# Patient Record
Sex: FEMALE | Race: ASIAN | Employment: UNEMPLOYED | URBAN - METROPOLITAN AREA
[De-identification: names, ages, dates, MRNs, and addresses within clinical notes are randomized per-mention and may not be internally consistent; named-entity substitution may affect disease eponyms.]

---

## 2017-02-17 ENCOUNTER — OFFICE VISIT (OUTPATIENT)
Dept: INTERNAL MEDICINE CLINIC | Age: 57
End: 2017-02-17

## 2017-02-17 VITALS
TEMPERATURE: 97.1 F | HEIGHT: 60 IN | BODY MASS INDEX: 28.47 KG/M2 | RESPIRATION RATE: 16 BRPM | OXYGEN SATURATION: 95 % | DIASTOLIC BLOOD PRESSURE: 66 MMHG | WEIGHT: 145 LBS | HEART RATE: 79 BPM | SYSTOLIC BLOOD PRESSURE: 138 MMHG

## 2017-02-17 DIAGNOSIS — M19.90 ARTHRITIS: ICD-10-CM

## 2017-02-17 DIAGNOSIS — M17.12 ARTHRITIS OF LEFT KNEE: Primary | ICD-10-CM

## 2017-02-17 RX ORDER — MELOXICAM 15 MG/1
15 TABLET ORAL
Qty: 30 TAB | Refills: 1 | Status: SHIPPED | OUTPATIENT
Start: 2017-02-17 | End: 2017-02-22 | Stop reason: SDUPTHER

## 2017-02-17 RX ORDER — ACETAMINOPHEN AND CODEINE PHOSPHATE 300; 30 MG/1; MG/1
1 TABLET ORAL
Qty: 20 TAB | Refills: 0 | Status: SHIPPED | OUTPATIENT
Start: 2017-02-17 | End: 2017-02-22 | Stop reason: SDUPTHER

## 2017-02-17 NOTE — PATIENT INSTRUCTIONS
Take the Meloxicam once daily for the next 1-2 weeks. Avoid taking with other with other NSAIDs (Asprin, ibuprofen, Motrin, Aleve, etc.). Take the Tylenol with codeine as needed for extreme pain. Arthritis: Care Instructions  Your Care Instructions  Arthritis, also called osteoarthritis, is a breakdown of the cartilage that cushions your joints. When the cartilage wears down, your bones rub against each other. This causes pain and stiffness. Many people have some arthritis as they age. Arthritis most often affects the joints of the spine, hands, hips, knees, or feet. You can take simple measures to protect your joints, ease your pain, and help you stay active. Follow-up care is a key part of your treatment and safety. Be sure to make and go to all appointments, and call your doctor if you are having problems. It's also a good idea to know your test results and keep a list of the medicines you take. How can you care for yourself at home? · Stay at a healthy weight. Being overweight puts extra strain on your joints. · Talk to your doctor or physical therapist about exercises that will help ease joint pain. ¨ Stretch. You may enjoy gentle forms of yoga to help keep your joints and muscles flexible. ¨ Walk instead of jog. Other types of exercise that are less stressful on the joints include riding a bicycle, swimming, or water exercise. ¨ Lift weights. Strong muscles help reduce stress on your joints. Stronger thigh muscles, for example, take some of the stress off of the knees and hips. Learn the right way to lift weights so you do not make joint pain worse. · Take your medicines exactly as prescribed. Call your doctor if you think you are having a problem with your medicine. · Take pain medicines exactly as directed. ¨ If the doctor gave you a prescription medicine for pain, take it as prescribed.   ¨ If you are not taking a prescription pain medicine, ask your doctor if you can take an over-the-counter medicine. · Use a cane, crutch, walker, or another device if you need help to get around. These can help rest your joints. You also can use other things to make life easier, such as a higher toilet seat and padded handles on kitchen utensils. · Do not sit in low chairs, which can make it hard to get up. · Put heat or cold on your sore joints as needed. Use whichever helps you most. You also can take turns with hot and cold packs. ¨ Apply heat 2 or 3 times a day for 20 to 30 minutes--using a heating pad, hot shower, or hot pack--to relieve pain and stiffness. ¨ Put ice or a cold pack on your sore joint for 10 to 20 minutes at a time. Put a thin cloth between the ice and your skin. When should you call for help? Call your doctor now or seek immediate medical care if:  · You have sudden swelling, warmth, or pain in any joint. · You have joint pain and a fever or rash. · You have such bad pain that you cannot use a joint. Watch closely for changes in your health, and be sure to contact your doctor if:  · You have mild joint symptoms that continue even with more than 6 weeks of care at home. · You have stomach pain or other problems with your medicine. Where can you learn more? Go to http://caryn-gerard.info/. Enter N355 in the search box to learn more about \"Arthritis: Care Instructions. \"  Current as of: February 24, 2016  Content Version: 11.1  © 2006-2016 "[employer]. Care instructions adapted under license by [employer] (which disclaims liability or warranty for this information). If you have questions about a medical condition or this instruction, always ask your healthcare professional. [place] [address] any warranty or liability for your use of this information. Meloxicam (By mouth)   Meloxicam (ykr-FH-q-melanie)  Treats symptoms of osteoarthritis (OA) and rheumatoid arthritis (RA). This medicine is an NSAID.    Brand Name(s):Comfort Pac with Meloxicam, Mobic, Vivlodex   There may be other brand names for this medicine. When This Medicine Should Not Be Used: This medicine is not right for everyone. Do not use it if you had an allergic reaction to meloxicam or another NSAID drug, including aspirin. How to Use This Medicine:   Capsule, Liquid, Tablet  · Take your medicine as directed. Your dose may need to be changed several times to find what works best for you. · You may take this medicine with or without food. If this medicine upsets your stomach, take it with food or milk. · Capsule or tablet: Swallow the capsule or tablet whole. Do not crush, break, or chew it. · Oral liquid: Shake the bottle gently before use. Measure the oral liquid medicine with a marked measuring spoon, oral syringe, or medicine cup. · This medicine should come with a Medication Guide. Ask your pharmacist for a copy if you do not have one. · Missed dose: Take a dose as soon as you remember. If it is almost time for your next dose, wait until then and take a regular dose. Do not take extra medicine to make up for a missed dose. · Store the medicine in a closed container at room temperature, away from heat, moisture, and direct light. Drugs and Foods to Avoid:   Ask your doctor or pharmacist before using any other medicine, including over-the-counter medicines, vitamins, and herbal products. · Do not use aspirin or any other NSAID medicine unless your doctor says it is okay. · Do not take the oral liquid together with Kayexalate®. Mobic® oral liquid contains sorbitol, which may cause a serious bowel problem if taken with sodium polystyrene sulfonate (Bristol Lota). · Some medicines can affect how meloxicam works.  Tell your doctor if you are also using any of the following:  ¨ Cholestyramine, cyclosporine, digoxin, lithium, methotrexate, pemetrexed  ¨ Blood pressure medicine  ¨ Blood thinner (including warfarin)  ¨ Diuretic (water pill)  ¨ Medicine to treat depression  ¨ Steroid medicine (including hydrocortisone, methylprednisolone, prednisolone, prednisone)  Warnings While Using This Medicine:   · Tell your doctor if you are pregnant or breastfeeding. Do not use this medicine during the later part of pregnancy unless your doctor tells you to. · Tell your doctor if you have a history of ulcers or other stomach problems, kidney disease, liver disease, heart disease, high blood pressure, heart failure, blood circulation problems, or asthma. Tell your doctor if you smoke or drink alcohol regularly  · This medicine may cause the following problems:  ¨ Increased risk of blood clots, heart attack, or stroke  ¨ Increased risk of stomach or bowel bleeding  ¨ High blood pressure  ¨ Liver or kidney problems  ¨ Anemia  · Tell your doctor if you are trying to get pregnant. Some women who take this medicine have trouble getting pregnant. · Your doctor will do lab tests at regular visits to check on the effects of this medicine. Keep all appointments. · Keep all medicine out of the reach of children. Never share your medicine with anyone.   Possible Side Effects While Using This Medicine:   Call your doctor right away if you notice any of these side effects:  · Allergic reaction: Itching or hives, swelling in your face or hands, swelling or tingling in your mouth or throat, chest tightness, trouble breathing  · Blistering, peeling, or red skin rash  · Change in how much or how often you urinate, painful urination  · Chest pain, trouble breathing, coughing up blood  · Dark urine or pale stools, nausea, vomiting, loss of appetite, stomach pain, yellow skin or eyes  · Numbness or weakness on one side of your body, sudden or severe headache, problems with vision, speech, or walking  · Rapid weight gain, swelling in your hands, ankles, or feet  · Severe stomach pain, vomiting blood or material that looks like coffee grounds, bloody or black, tarry stools  · Unusual bleeding, bruising, or weakness  If you notice these less serious side effects, talk with your doctor:   · Mild nausea, diarrhea, heartburn  If you notice other side effects that you think are caused by this medicine, tell your doctor. Call your doctor for medical advice about side effects. You may report side effects to FDA at 6-065-NOU-9172  © 2016 [address] is for End User's use only and may not be sold, redistributed or otherwise used for commercial purposes. The above information is an  only. It is not intended as medical advice for individual conditions or treatments. Talk to your doctor, nurse or pharmacist before following any medical regimen to see if it is safe and effective for you. Acetaminophen/Codeine (By mouth)   Acetaminophen (q-ngxj-a-MIN-oh-fen), Codeine Phosphate (KOE-oumar FOS-fate)  Treats mild to moderately severe pain. This medicine contains a narcotic pain reliever. Brand Name(s):Capital w/Codeine, Tylenol With Codeine No. 4, Tylenol w/Codeine #3, Tylenol w/Codeine #4, Tylenol with Codeine No. 3   There may be other brand names for this medicine. When This Medicine Should Not Be Used: This medicine is not right for everyone. Do not use it if you had an allergic reaction to acetaminophen or codeine, or to other narcotic medicines. How to Use This Medicine:   Capsule, Liquid, Tablet  · Your doctor will tell you how much medicine to use. Do not use more than directed. · You may take this medicine with food or milk if it upsets your stomach. · Measure the oral liquid medicine with a marked measuring spoon, oral syringe, or medicine cup. · Drink plenty of liquids to help avoid constipation. · Missed dose: Take a dose as soon as you remember. If it is almost time for your next dose, wait until then and take a regular dose. Do not take extra medicine to make up for a missed dose.   · Store the medicine in a closed container at room temperature, away from heat, moisture, and direct light. Keep the oral liquid in the refrigerator. Do not freeze. Drugs and Foods to Avoid:   Ask your doctor or pharmacist before using any other medicine, including over-the-counter medicines, vitamins, and herbal products. · Some medicines and foods can affect how this medicine works. Tell your doctor if you are taking any of the following:   ¨ Depression medicine, such as citalopram, fluoxetine, sertraline  ¨ Phenothiazine medicine, such as prochlorperazine  ¨ Tranquilizer medicine, such as chlordiazepoxide  · Do not drink alcohol while you are using this medicine. Acetaminophen can damage your liver, and alcohol can increase this risk. Do not take acetaminophen without asking your doctor if you have 3 or more drinks of alcohol every day. Warnings While Using This Medicine:   · Tell your doctor if you are pregnant or breastfeeding, or if you have kidney disease, liver disease, adrenal problems (such as Gavin disease), asthma, or breathing problems (such as respiratory depression, sleep apnea). Tell your doctor if you have an enlarged prostate, trouble urinating, stomach problems, an underactive thyroid, or a history of head injury or brain damage. Tell your doctor if you had an allergic reaction to sulfites or if you have a history of drug or alcohol abuse. · This medicine can be habit-forming. Do not use more than your prescribed dose. Call your doctor if you think your medicine is not working. · This medicine contains acetaminophen. Read the labels of all other medicines you are using to see if they also contain acetaminophen, or ask your doctor or pharmacist. Sina Hernandez not use more than 4 grams (4,000 milligrams) total of acetaminophen in one day. · If you take this medicine for more than a few weeks, do not stop taking it suddenly. Your doctor will need to slowly decrease your dose before you stop it completely.   · Get emergency help immediately if you think you may have taken too much of this medicine. Signs of an overdose include shallow breathing, fainting, confusion, nausea, vomiting, pinpoint pupils of the eyes, or pale or blue lips, fingernails, or skin. · This medicine may make you dizzy or drowsy. Do not drive or do anything that could be dangerous until you know how this medicine affects you. · Tell any doctor or dentist who treats you that you are using this medicine. This medicine may affect certain medical test results. · This medicine may cause constipation, especially with long-term use. Ask your doctor if you should use a laxative to prevent and treat constipation. · Keep all medicine out of the reach of children. Never share your medicine with anyone. Possible Side Effects While Using This Medicine:   Call your doctor right away if you notice any of these side effects:  · Allergic reaction: Itching or hives, swelling in your face or hands, swelling or tingling in your mouth or throat, chest tightness, trouble breathing  · Dark urine or pale stools, nausea, vomiting, loss of appetite, stomach pain, yellow skin or eyes  · Extreme drowsiness or confusion  · Lightheadedness, dizziness, or fainting  · Seizures  · Trouble breathing, shallow breathing, blue lips or nails  · Unusual bleeding, bruising, or weakness  If you notice these less serious side effects, talk with your doctor:   · Mild dizziness or drowsiness  · Mild nausea or vomiting, constipation  · Rash or itching skin  If you notice other side effects that you think are caused by this medicine, tell your doctor. Call your doctor for medical advice about side effects. You may report side effects to FDA at 5-312-SUZ-9511  © 2016 [address] is for End User's use only and may not be sold, redistributed or otherwise used for commercial purposes. The above information is an  only.  It is not intended as medical advice for individual conditions or treatments. Talk to your doctor, nurse or pharmacist before following any medical regimen to see if it is safe and effective for you.

## 2017-02-17 NOTE — PROGRESS NOTES
Pt presented today with left leg pain x 1 week, pt denies any injury. Has pt had any falls since last visit? No.  Pt preferred language for health care discussion is english. Advanced Directive? No    Is someone accompanying this pt? No    Is the patient using any DME equipment during OV? No      1. Have you been to the ER, urgent care clinic since your last visit? Hospitalized since your last visit? No    2. Have you seen or consulted any other health care providers outside of the [address] [place] [address] since your last visit? Include any pap smears or colon screening. No      Pt has a reminder for a \"due or due soon\" health maintenance. I have asked that she contact his primary care provider for follow-up on this health maintenance.

## 2017-02-17 NOTE — PROGRESS NOTES
HISTORY OF PRESENT ILLNESS  Phi Raymundo is a 49 y.o. female. HPI Comments: Pt presents with left knee pain and swelling that has been ongoing for about one week. States she has a history of arthritis in the left knee. She points to the inside of the left knee and indicates it radiates down to the foot on that side. She put some arthritis cream on it, which didn't seem to help. Leg Pain    Associated symptoms include tingling (hx of neuropathy; also tingling in left knee). Review of Systems   Constitutional: Negative for fever. Respiratory: Negative for shortness of breath. Cardiovascular: Negative for chest pain. Musculoskeletal: Positive for joint pain (left knee). Neurological: Positive for tingling (hx of neuropathy; also tingling in left knee). Visit Vitals    /66 (BP 1 Location: Right arm, BP Patient Position: Sitting)    Pulse 79    Temp 97.1 °F (36.2 °C) (Oral)    Resp 16    Ht 5' (1.524 m)    Wt 145 lb (65.8 kg)    SpO2 95%    BMI 28.32 kg/m2       Physical Exam   Constitutional: She is oriented to person, place, and time. Vital signs are normal. She appears well-developed and well-nourished. She is cooperative. She does not appear ill. No distress. HENT:   Head: Normocephalic and atraumatic. Nose: Nose normal.   Mouth/Throat: Uvula is midline, oropharynx is clear and moist and mucous membranes are normal.   Eyes: Conjunctivae are normal.   Neck: Neck supple. Cardiovascular: Normal rate and normal heart sounds. Pulses:       Radial pulses are 2+ on the right side, and 2+ on the left side. Dorsalis pedis pulses are 2+ on the right side, and 2+ on the left side. Posterior tibial pulses are 2+ on the right side, and 2+ on the left side. Pulmonary/Chest: Effort normal. No respiratory distress. Musculoskeletal:        Right knee: Normal.        Left knee: She exhibits swelling. She exhibits no ecchymosis and normal patellar mobility.  Tenderness found. Left ankle: Normal. No tenderness. ROM limited on flexion of left knee to 90'. Left knee extends fully. Clicking of knee noted with flexion. Some tenderness is noted in the left calf . Neurological: She is alert and oriented to person, place, and time. Sensation grossly normal in left lower leg   Skin: Skin is warm and dry. Psychiatric: She has a normal mood and affect. Her speech is normal and behavior is normal. Thought content normal.   Nursing note and vitals reviewed. ASSESSMENT and PLAN   reviewed. Shows only one prescription for Tylenol 3. Will refill. ICD-10-CM ICD-9-CM    1. Arthritis of left knee M19.90 716.96 REFERRAL TO ORTHOPEDIC SURGERY      acetaminophen-codeine (TYLENOL-CODEINE #3) 300-30 mg per tablet      meloxicam (MOBIC) 15 mg tablet   2. Arthritis M19.90 716.90 REFERRAL TO ORTHOPEDIC SURGERY      acetaminophen-codeine (TYLENOL-CODEINE #3) 300-30 mg per tablet      meloxicam (MOBIC) 15 mg tablet     Provided after-visit information on  Arthritis, meloxicam, and tylenol/codeine. Reviewed reasons to return or seek emergency care. Pt indicates she is scheduled to see her PCP in March; advised her to follow up for HM. Pt verbalized understanding and agreement with the plan of care.     Lanre Lovett PA-C

## 2017-02-17 NOTE — MR AVS SNAPSHOT
Visit Information Date & Time Provider Department Dept. Phone Encounter #  
 2/17/2017 10:15 AM Aravind Martienz [phone] [id number] Your Appointments 3/8/2017  2:00 PM  
[place] [id number] [facility] [address]) Appt Note: return in 3 months [address] [facility] [phone] [place]  
  
   
 [address] [place] [address] [address] Upcoming Health Maintenance Date Due  
 FOOT EXAM Q1 8/5/2016 HEMOGLOBIN A1C Q6M 5/9/2017 EYE EXAM RETINAL OR DILATED Q1 6/10/2017 MICROALBUMIN Q1 9/28/2017 LIPID PANEL Q1 9/28/2017 BREAST CANCER SCRN MAMMOGRAM 7/20/2018 PAP AKA CERVICAL CYTOLOGY 5/18/2019 DTaP/Tdap/Td series (2 - Td) 8/5/2025 COLONOSCOPY 8/5/2025 Allergies as of 2/17/2017  Review Complete On: 2/17/2017 By: Wilma Alan PA-C No Known Allergies Current Immunizations  Reviewed on 11/9/2016 Name Date Influenza Vaccine 8/19/2015 Influenza Vaccine (Quad) PF 11/9/2016 Pneumococcal Polysaccharide (PPSV-23) 8/5/2015 Tdap 8/5/2015 Not reviewed this visit You Were Diagnosed With   
  
 Codes Comments Arthritis of left knee    -  Primary ICD-10-CM: M19.90 ICD-9-CM: 716.96 Arthritis     ICD-10-CM: M19.90 ICD-9-CM: 716.90 Vitals BP Pulse Temp Resp Height(growth percentile) Weight(growth percentile)  
 138/66 (BP 1 Location: Right arm, BP Patient Position: Sitting) 79 97.1 °F (36.2 °C) (Oral) 16 5' (1.524 m) 145 lb (65.8 kg) SpO2 BMI OB Status Smoking Status 95% 28.32 kg/m2 Menopause Never Smoker Vitals History BMI and BSA Data Body Mass Index Body Surface Area  
 28.32 kg/m 2 1.67 m 2 Preferred Pharmacy Pharmacy Name Phone [facility] [address] [phone] Your Updated Medication List  
  
   
 This list is accurate as of: 2/17/17 12:03 PM.  Always use your most recent med list.  
  
  
  
  
 acetaminophen-codeine 300-30 mg per tablet Commonly known as:  TYLENOL-CODEINE #3 Take 1 Tab by mouth every six (6) hours as needed for Pain. Max Daily Amount: 4 Tabs. fluticasone 50 mcg/actuation nasal spray Commonly known as:  Elihue Caller 2 Sprays by Both Nostrils route daily. gabapentin 100 mg capsule Commonly known as:  NEURONTIN Take 1 Cap by mouth daily. glucose blood VI test strips strip Commonly known as:  ASCENSIA AUTODISC VI, ONE TOUCH ULTRA TEST VI Any reliable brand to fit pt's glucometer test once a day  Dx E11.21  
  
 insulin aspart protamine/insulin aspart 100 unit/mL (70-30) Inpn Commonly known as:  NovoLOG Mix 70-30 FlexPen  
0.5 mL by SubCUTAneous route three (3) times daily. Insulin Needles (Disposable) 30 gauge x 5/16\" Ndle Commonly known as:  PEN NEEDLE Use 3 times a day with insulin Lancets Misc Any reliable brand  Dx E11.21  Use daily  
  
 loratadine-pseudoephedrine 5-120 mg per tablet Commonly known as:  CLARITIN-D 12 HOUR Take 1 Tab by mouth two (2) times a day. meloxicam 15 mg tablet Commonly known as:  MOBIC Take 1 Tab by mouth daily as needed (inflammation). metFORMIN 1,000 mg tablet Commonly known as:  GLUCOPHAGE Take 1 Tab by mouth two (2) times daily (with meals). simvastatin 40 mg tablet Commonly known as:  ZOCOR Take 1 Tab by mouth nightly. Prescriptions Printed Refills  
 acetaminophen-codeine (TYLENOL-CODEINE #3) 300-30 mg per tablet 0 Sig: Take 1 Tab by mouth every six (6) hours as needed for Pain. Max Daily Amount: 4 Tabs. Class: Print Route: Oral  
  
Prescriptions Sent to Pharmacy Refills  
 meloxicam (MOBIC) 15 mg tablet 1 Sig: Take 1 Tab by mouth daily as needed (inflammation).   
 Class: Normal  
 Pharmacy: [address] [place] [address] #: [phone] Route: Oral  
  
We Performed the Following REFERRAL TO ORTHOPEDIC SURGERY [REF62 Custom] Comments:  
 Please evaluate patient for left knee arthritis. Referral Information Referral ID Referred By Referred To  
  
 [id number] BOBBI ENGEL MD   
   [address] [place] [address], [facility] Phone: [phone] Fax: [phone] Visits Status Start Date End Date 1 New Request 2/17/17 2/17/18 If your referral has a status of pending review or denied, additional information will be sent to support the outcome of this decision. Patient Instructions Take the Meloxicam once daily for the next 1-2 weeks. Avoid taking with other with other NSAIDs (Asprin, ibuprofen, Motrin, Aleve, etc.). Take the Tylenol with codeine as needed for extreme pain. Arthritis: Care Instructions Your Care Instructions Arthritis, also called osteoarthritis, is a breakdown of the cartilage that cushions your joints. When the cartilage wears down, your bones rub against each other. This causes pain and stiffness. Many people have some arthritis as they age. Arthritis most often affects the joints of the spine, hands, hips, knees, or feet. You can take simple measures to protect your joints, ease your pain, and help you stay active. Follow-up care is a key part of your treatment and safety. Be sure to make and go to all appointments, and call your doctor if you are having problems. It's also a good idea to know your test results and keep a list of the medicines you take. How can you care for yourself at home? · Stay at a healthy weight. Being overweight puts extra strain on your joints. · Talk to your doctor or physical therapist about exercises that will help ease joint pain. ¨ Stretch. You may enjoy gentle forms of yoga to help keep your joints and muscles flexible. ¨ Walk instead of jog. Other types of exercise that are less stressful on the joints include riding a bicycle, swimming, or water exercise. ¨ Lift weights. Strong muscles help reduce stress on your joints. Stronger thigh muscles, for example, take some of the stress off of the knees and hips. Learn the right way to lift weights so you do not make joint pain worse. · Take your medicines exactly as prescribed. Call your doctor if you think you are having a problem with your medicine. · Take pain medicines exactly as directed. ¨ If the doctor gave you a prescription medicine for pain, take it as prescribed. ¨ If you are not taking a prescription pain medicine, ask your doctor if you can take an over-the-counter medicine. · Use a cane, crutch, walker, or another device if you need help to get around. These can help rest your joints. You also can use other things to make life easier, such as a higher toilet seat and padded handles on kitchen utensils. · Do not sit in low chairs, which can make it hard to get up. · Put heat or cold on your sore joints as needed. Use whichever helps you most. You also can take turns with hot and cold packs. ¨ Apply heat 2 or 3 times a day for 20 to 30 minutesusing a heating pad, hot shower, or hot packto relieve pain and stiffness. ¨ Put ice or a cold pack on your sore joint for 10 to 20 minutes at a time. Put a thin cloth between the ice and your skin. When should you call for help? Call your doctor now or seek immediate medical care if: 
· You have sudden swelling, warmth, or pain in any joint. · You have joint pain and a fever or rash. · You have such bad pain that you cannot use a joint. Watch closely for changes in your health, and be sure to contact your doctor if: 
· You have mild joint symptoms that continue even with more than 6 weeks of care at home. · You have stomach pain or other problems with your medicine. Where can you learn more? Go to http://caryn-gerard.info/. Enter H871 in the search box to learn more about \"Arthritis: Care Instructions. \" Current as of: February 24, 2016 Content Version: 11.1 © 7417-5209 [employer]. Care instructions adapted under license by [employer] (which disclaims liability or warranty for this information). If you have questions about a medical condition or this instruction, always ask your healthcare professional. [address] any warranty or liability for your use of this information. Meloxicam (By mouth) Meloxicam (rdh-JV-h-melanie) Treats symptoms of osteoarthritis (OA) and rheumatoid arthritis (RA). This medicine is an NSAID. Brand Name(s):Comfort Pac with Meloxicam, Mobic, Vivlodex There may be other brand names for this medicine. When This Medicine Should Not Be Used: This medicine is not right for everyone. Do not use it if you had an allergic reaction to meloxicam or another NSAID drug, including aspirin. How to Use This Medicine:  
Capsule, Liquid, Tablet · Take your medicine as directed. Your dose may need to be changed several times to find what works best for you. · You may take this medicine with or without food. If this medicine upsets your stomach, take it with food or milk. · Capsule or tablet: Swallow the capsule or tablet whole. Do not crush, break, or chew it. · Oral liquid: Shake the bottle gently before use. Measure the oral liquid medicine with a marked measuring spoon, oral syringe, or medicine cup. · This medicine should come with a Medication Guide. Ask your pharmacist for a copy if you do not have one. · Missed dose: Take a dose as soon as you remember. If it is almost time for your next dose, wait until then and take a regular dose. Do not take extra medicine to make up for a missed dose. · Store the medicine in a closed container at room temperature, away from heat, moisture, and direct light. Drugs and Foods to Avoid: Ask your doctor or pharmacist before using any other medicine, including over-the-counter medicines, vitamins, and herbal products. · Do not use aspirin or any other NSAID medicine unless your doctor says it is okay. · Do not take the oral liquid together with Kayexalate®. Mobic® oral liquid contains sorbitol, which may cause a serious bowel problem if taken with sodium polystyrene sulfonate (Candace Kp). · Some medicines can affect how meloxicam works. Tell your doctor if you are also using any of the following: ¨ Cholestyramine, cyclosporine, digoxin, lithium, methotrexate, pemetrexed ¨ Blood pressure medicine ¨ Blood thinner (including warfarin) ¨ Diuretic (water pill) ¨ Medicine to treat depression ¨ Steroid medicine (including hydrocortisone, methylprednisolone, prednisolone, prednisone) Warnings While Using This Medicine: · Tell your doctor if you are pregnant or breastfeeding. Do not use this medicine during the later part of pregnancy unless your doctor tells you to. · Tell your doctor if you have a history of ulcers or other stomach problems, kidney disease, liver disease, heart disease, high blood pressure, heart failure, blood circulation problems, or asthma. Tell your doctor if you smoke or drink alcohol regularly · This medicine may cause the following problems: 
¨ Increased risk of blood clots, heart attack, or stroke ¨ Increased risk of stomach or bowel bleeding ¨ High blood pressure ¨ Liver or kidney problems ¨ Anemia · Tell your doctor if you are trying to get pregnant. Some women who take this medicine have trouble getting pregnant. · Your doctor will do lab tests at regular visits to check on the effects of this medicine. Keep all appointments. · Keep all medicine out of the reach of children. Never share your medicine with anyone. Possible Side Effects While Using This Medicine:  
Call your doctor right away if you notice any of these side effects: · Allergic reaction: Itching or hives, swelling in your face or hands, swelling or tingling in your mouth or throat, chest tightness, trouble breathing · Blistering, peeling, or red skin rash · Change in how much or how often you urinate, painful urination · Chest pain, trouble breathing, coughing up blood · Dark urine or pale stools, nausea, vomiting, loss of appetite, stomach pain, yellow skin or eyes · Numbness or weakness on one side of your body, sudden or severe headache, problems with vision, speech, or walking · Rapid weight gain, swelling in your hands, ankles, or feet · Severe stomach pain, vomiting blood or material that looks like coffee grounds, bloody or black, tarry stools · Unusual bleeding, bruising, or weakness If you notice these less serious side effects, talk with your doctor: · Mild nausea, diarrhea, heartburn If you notice other side effects that you think are caused by this medicine, tell your doctor. Call your doctor for medical advice about side effects. You may report side effects to FDA at 0-101-FDA-8263 © 2016 [address] is for End User's use only and may not be sold, redistributed or otherwise used for commercial purposes. The above information is an  only. It is not intended as medical advice for individual conditions or treatments. Talk to your doctor, nurse or pharmacist before following any medical regimen to see if it is safe and effective for you. Acetaminophen/Codeine (By mouth) Acetaminophen (t-kral-a-MIN-oh-fen), Codeine Phosphate (KOE-oumar FOS-fate) Treats mild to moderately severe pain. This medicine contains a narcotic pain reliever.   
Brand Name(s):Capital w/Codeine, Tylenol With Codeine No. 4, Tylenol w/Codeine #3, Tylenol w/Codeine #4, Tylenol with Codeine No. 3  
 There may be other brand names for this medicine. When This Medicine Should Not Be Used: This medicine is not right for everyone. Do not use it if you had an allergic reaction to acetaminophen or codeine, or to other narcotic medicines. How to Use This Medicine:  
Capsule, Liquid, Tablet · Your doctor will tell you how much medicine to use. Do not use more than directed. · You may take this medicine with food or milk if it upsets your stomach. · Measure the oral liquid medicine with a marked measuring spoon, oral syringe, or medicine cup. · Drink plenty of liquids to help avoid constipation. · Missed dose: Take a dose as soon as you remember. If it is almost time for your next dose, wait until then and take a regular dose. Do not take extra medicine to make up for a missed dose. · Store the medicine in a closed container at room temperature, away from heat, moisture, and direct light. Keep the oral liquid in the refrigerator. Do not freeze. Drugs and Foods to Avoid: Ask your doctor or pharmacist before using any other medicine, including over-the-counter medicines, vitamins, and herbal products. · Some medicines and foods can affect how this medicine works. Tell your doctor if you are taking any of the following: ¨ Depression medicine, such as citalopram, fluoxetine, sertraline ¨ Phenothiazine medicine, such as prochlorperazine ¨ Tranquilizer medicine, such as chlordiazepoxide · Do not drink alcohol while you are using this medicine. Acetaminophen can damage your liver, and alcohol can increase this risk. Do not take acetaminophen without asking your doctor if you have 3 or more drinks of alcohol every day. Warnings While Using This Medicine: · Tell your doctor if you are pregnant or breastfeeding, or if you have kidney disease, liver disease, adrenal problems (such as Juniata disease), asthma, or breathing problems (such as respiratory depression, sleep apnea). Tell your doctor if you have an enlarged prostate, trouble urinating, stomach problems, an underactive thyroid, or a history of head injury or brain damage. Tell your doctor if you had an allergic reaction to sulfites or if you have a history of drug or alcohol abuse. · This medicine can be habit-forming. Do not use more than your prescribed dose. Call your doctor if you think your medicine is not working. · This medicine contains acetaminophen. Read the labels of all other medicines you are using to see if they also contain acetaminophen, or ask your doctor or pharmacist. Yun Falk not use more than 4 grams (4,000 milligrams) total of acetaminophen in one day. · If you take this medicine for more than a few weeks, do not stop taking it suddenly. Your doctor will need to slowly decrease your dose before you stop it completely. · Get emergency help immediately if you think you may have taken too much of this medicine. Signs of an overdose include shallow breathing, fainting, confusion, nausea, vomiting, pinpoint pupils of the eyes, or pale or blue lips, fingernails, or skin. · This medicine may make you dizzy or drowsy. Do not drive or do anything that could be dangerous until you know how this medicine affects you. · Tell any doctor or dentist who treats you that you are using this medicine. This medicine may affect certain medical test results. · This medicine may cause constipation, especially with long-term use. Ask your doctor if you should use a laxative to prevent and treat constipation. · Keep all medicine out of the reach of children. Never share your medicine with anyone. Possible Side Effects While Using This Medicine:  
Call your doctor right away if you notice any of these side effects: · Allergic reaction: Itching or hives, swelling in your face or hands, swelling or tingling in your mouth or throat, chest tightness, trouble breathing · Dark urine or pale stools, nausea, vomiting, loss of appetite, stomach pain, yellow skin or eyes · Extreme drowsiness or confusion · Lightheadedness, dizziness, or fainting · Seizures · Trouble breathing, shallow breathing, blue lips or nails · Unusual bleeding, bruising, or weakness If you notice these less serious side effects, talk with your doctor: · Mild dizziness or drowsiness · Mild nausea or vomiting, constipation · Rash or itching skin If you notice other side effects that you think are caused by this medicine, tell your doctor. Call your doctor for medical advice about side effects. You may report side effects to FDA at 5-912-ZLI-0710 © 2016 [address] is for End User's use only and may not be sold, redistributed or otherwise used for commercial purposes. The above information is an  only. It is not intended as medical advice for individual conditions or treatments. Talk to your doctor, nurse or pharmacist before following any medical regimen to see if it is safe and effective for you. Introducing [address]! [employer] [place] [employer] [facility] introduces [employer] patient portal. Now you can access parts of your medical record, email your doctor's office, and request medication refills online. 1. In your internet browser, go to [email or web address] 2. Click on the First Time User? Click Here link in the Sign In box. You will see the New Member Sign Up page. 3. Enter your [employer] Access Code exactly as it appears below. You will not need to use this code after youve completed the sign-up process. If you do not sign up before the expiration date, you must request a new code. · [employer] Access Code: [email or web address] Expires: 5/18/2017 11:58 AM 
 
4. Enter the last four digits of your Social Security Number (xxxx) and Date of Birth (mm/dd/yyyy) as indicated and click Submit. You will be taken to the next sign-up page. 5. Create a [employer] ID. This will be your [employer] login ID and cannot be changed, so think of one that is secure and easy to remember. 6. Create a [employer] password. You can change your password at any time. 7. Enter your Password Reset Question and Answer. This can be used at a later time if you forget your password. 8. Enter your e-mail address. You will receive e-mail notification when new information is available in [address] 9. Click Sign Up. You can now view and download portions of your medical record. 10. Click the Download Summary menu link to download a portable copy of your medical information. If you have questions, please visit the Frequently Asked Questions section of the [employer] website. Remember, [employer] is NOT to be used for urgent needs. For medical emergencies, dial 911. Now available from your iPhone and Android! Please provide this summary of care documentation to your next provider. Your primary care clinician is listed as [id number] [facility]. If you have any questions after today's visit, please call [phone].

## 2017-02-22 ENCOUNTER — OFFICE VISIT (OUTPATIENT)
Dept: FAMILY MEDICINE CLINIC | Age: 57
End: 2017-02-22

## 2017-02-22 VITALS
OXYGEN SATURATION: 96 % | HEIGHT: 60 IN | BODY MASS INDEX: 28.27 KG/M2 | WEIGHT: 144 LBS | RESPIRATION RATE: 16 BRPM | DIASTOLIC BLOOD PRESSURE: 75 MMHG | TEMPERATURE: 97.8 F | SYSTOLIC BLOOD PRESSURE: 134 MMHG | HEART RATE: 90 BPM

## 2017-02-22 DIAGNOSIS — M19.90 ARTHRITIS: ICD-10-CM

## 2017-02-22 DIAGNOSIS — E78.00 PURE HYPERCHOLESTEROLEMIA: ICD-10-CM

## 2017-02-22 DIAGNOSIS — M17.12 ARTHRITIS OF LEFT KNEE: ICD-10-CM

## 2017-02-22 RX ORDER — ACETAMINOPHEN AND CODEINE PHOSPHATE 300; 30 MG/1; MG/1
1 TABLET ORAL
Qty: 20 TAB | Refills: 0 | Status: SHIPPED | OUTPATIENT
Start: 2017-02-22

## 2017-02-22 RX ORDER — FLUTICASONE PROPIONATE 50 MCG
2 SPRAY, SUSPENSION (ML) NASAL DAILY
Qty: 1 BOTTLE | Refills: 12 | Status: SHIPPED | OUTPATIENT
Start: 2017-02-22

## 2017-02-22 RX ORDER — NYSTATIN 100000 U/G
CREAM TOPICAL 2 TIMES DAILY
Qty: 30 G | Refills: 12 | Status: SHIPPED | OUTPATIENT
Start: 2017-02-22

## 2017-02-22 RX ORDER — MELOXICAM 15 MG/1
15 TABLET ORAL
Qty: 30 TAB | Refills: 1 | Status: SHIPPED | OUTPATIENT
Start: 2017-02-22

## 2017-02-22 NOTE — PROGRESS NOTES
Geri Manzano is a 49 y.o.  female and presents with    Chief Complaint   Patient presents with    Arthritis    Rash    Allergic Rhinitis           Subjective:    Osteoarthritis and Chronic Pain:  Patient has osteoarthritis, primarily affecting the diffuse. Symptoms onset: problem is longstanding. Rheumatological ROS: no current joint or muscle symptoms, essentially pain-free. Response to treatment plan: stable. Allergic rhintiis needs claritin  Rash vaginal ongoing    Additional Concerns:          Patient Active Problem List    Diagnosis Date Noted    Arthritis 10/07/2015    Allergic rhinitis 07/01/2015    Diabetes mellitus type 2, controlled ([facility] [address] 07/01/2015    Pure hypercholesterolemia 07/01/2015     Current Outpatient Prescriptions   Medication Sig Dispense Refill    meloxicam (MOBIC) 15 mg tablet Take 1 Tab by mouth daily as needed (inflammation). 30 Tab 1    acetaminophen-codeine (TYLENOL-CODEINE #3) 300-30 mg per tablet Take 1 Tab by mouth every six (6) hours as needed for Pain. Max Daily Amount: 4 Tabs. 20 Tab 0    fluticasone (FLONASE) 50 mcg/actuation nasal spray 2 Sprays by Both Nostrils route daily. 1 Bottle 12    loratadine-pseudoephedrine (CLARITIN-D 12 HOUR) 5-120 mg per tablet Take 1 Tab by mouth two (2) times a day. 30 Tab 6    nystatin (MYCOSTATIN) topical cream Apply  to affected area two (2) times a day. 30 g 12    Insulin Needles, Disposable, (PEN NEEDLE) 30 gauge x 5/16\" ndle Use 3 times a day with insulin 300 Each 12    metFORMIN (GLUCOPHAGE) 1,000 mg tablet Take 1 Tab by mouth two (2) times daily (with meals). 60 Tab 0    simvastatin (ZOCOR) 40 mg tablet Take 1 Tab by mouth nightly. 90 Tab 4    gabapentin (NEURONTIN) 100 mg capsule Take 1 Cap by mouth daily.  30 Cap 1    glucose blood VI test strips (ASCENSIA AUTODISC VI, ONE TOUCH ULTRA TEST VI) strip Any reliable brand to fit pt's glucometer test once a day  Dx E11.21 100 Strip 12    Lancets misc Any reliable brand  Dx E11.21  Use daily 100 Each 11    insulin aspart protamine/insulin aspart (NOVOLOG MIX 70-30 FLEXPEN) 100 unit/mL (70-30) inpn 0.5 mL by SubCUTAneous route three (3) times daily. 30 Pen 12     No Known Allergies  Past Medical History:   Diagnosis Date    Pure hypercholesterolemia 7/1/2015     History reviewed. No pertinent surgical history. Family History   Problem Relation Age of Onset    Heart Disease Mother     Lung Disease Father      Social History   Substance Use Topics    Smoking status: Never Smoker    Smokeless tobacco: Never Used    Alcohol use No       ROS       All other systems reviewed and are negative. Objective:  Vitals:    02/22/17 1437   BP: 134/75   Pulse: 90   Resp: 16   Temp: 97.8 °F (36.6 °C)   TempSrc: Oral   SpO2: 96%   Weight: 144 lb (65.3 kg)   Height: 5' (1.524 m)   PainSc:  10 - Worst pain ever   PainLoc: Generalized                 alert, well appearing, and in no distress, oriented to person, place, and time and normal appearing weight  Chest - clear to auscultation, no wheezes, rales or rhonchi, symmetric air entry  Heart - normal rate, regular rhythm, normal S1, S2, no murmurs, rubs, clicks or gallops  Abdomen - soft, nontender, nondistended, no masses or organomegaly        LABS     TESTS      Assessment/Plan:    Rash rx nystatin f/u prn  Allergy rx claritin d f/u prn  arthrits rx mobic and t 3 and f/u prn      Lab review:       I have discussed the diagnosis with the patient and the intended plan as seen in the above orders. The patient has received an after-visit summary and questions were answered concerning future plans. I have discussed medication side effects and warnings with the patient as well. I have reviewed the plan of care with the patient, accepted their input and they are in agreement with the treatment goals.      Follow-up Disposition: Not on File

## 2017-02-22 NOTE — MR AVS SNAPSHOT
Visit Information Date & Time Provider Department Dept. Phone Encounter #  
 2/22/2017  2:30 PM Lebron Ramirez [phone] [id number] Follow-up Instructions Return in about 3 months (around 5/22/2017) for rov. Your Appointments 3/8/2017  2:00 PM  
ROUTINE CARE with Juan StephensonArthurDO [address] [facility]) Appt Note: return in 3 months [address] [place] [address]  
  
   
 [address] [place] [address] [address] Upcoming Health Maintenance Date Due  
 FOOT EXAM Q1 8/5/2016 HEMOGLOBIN A1C Q6M 5/9/2017 EYE EXAM RETINAL OR DILATED Q1 6/10/2017 MICROALBUMIN Q1 9/28/2017 LIPID PANEL Q1 9/28/2017 BREAST CANCER SCRN MAMMOGRAM 7/20/2018 PAP AKA CERVICAL CYTOLOGY 5/18/2019 DTaP/Tdap/Td series (2 - Td) 8/5/2025 COLONOSCOPY 8/5/2025 Allergies as of 2/22/2017  Review Complete On: 2/22/2017 By: Cyrus Samano LPN No Known Allergies Current Immunizations  Reviewed on 11/9/2016 Name Date Influenza Vaccine 8/19/2015 Influenza Vaccine (Quad) PF 11/9/2016 Pneumococcal Polysaccharide (PPSV-23) 8/5/2015 Tdap 8/5/2015 Not reviewed this visit You Were Diagnosed With   
  
 Codes Comments Arthritis     ICD-10-CM: M19.90 ICD-9-CM: 716.90 Arthritis of left knee     ICD-10-CM: M19.90 ICD-9-CM: 716.96   
 Pure hypercholesterolemia     ICD-10-CM: E78.00 ICD-9-CM: 272.0 Vitals BP  
  
  
  
  
  
 134/75 (BP 1 Location: Right arm, BP Patient Position: Sitting) BMI and BSA Data Body Mass Index Body Surface Area  
 28.12 kg/m 2 1.66 m 2 Preferred Pharmacy Pharmacy Name Phone [address] [phone] Your Updated Medication List  
  
   
This list is accurate as of: 2/22/17  3:10 PM.  Always use your most recent med list.  
  
  
  
  
 acetaminophen-codeine 300-30 mg per tablet Commonly known as:  TYLENOL-CODEINE #3 Take 1 Tab by mouth every six (6) hours as needed for Pain. Max Daily Amount: 4 Tabs. fluticasone 50 mcg/actuation nasal spray Commonly known as:  Caffie Euler 2 Sprays by Both Nostrils route daily. gabapentin 100 mg capsule Commonly known as:  NEURONTIN Take 1 Cap by mouth daily. glucose blood VI test strips strip Commonly known as:  ASCENSIA AUTODISC VI, ONE TOUCH ULTRA TEST VI Any reliable brand to fit pt's glucometer test once a day  Dx E11.21  
  
 insulin aspart protamine/insulin aspart 100 unit/mL (70-30) Inpn Commonly known as:  NovoLOG Mix 70-30 FlexPen  
0.5 mL by SubCUTAneous route three (3) times daily. Insulin Needles (Disposable) 30 gauge x 5/16\" Ndle Commonly known as:  PEN NEEDLE Use 3 times a day with insulin Lancets Misc Any reliable brand  Dx E11.21  Use daily  
  
 loratadine-pseudoephedrine 5-120 mg per tablet Commonly known as:  CLARITIN-D 12 HOUR Take 1 Tab by mouth two (2) times a day. meloxicam 15 mg tablet Commonly known as:  MOBIC Take 1 Tab by mouth daily as needed (inflammation). metFORMIN 1,000 mg tablet Commonly known as:  GLUCOPHAGE Take 1 Tab by mouth two (2) times daily (with meals). nystatin topical cream  
Commonly known as:  MYCOSTATIN Apply  to affected area two (2) times a day. simvastatin 40 mg tablet Commonly known as:  ZOCOR Take 1 Tab by mouth nightly. Prescriptions Printed Refills  
 acetaminophen-codeine (TYLENOL-CODEINE #3) 300-30 mg per tablet 0 Sig: Take 1 Tab by mouth every six (6) hours as needed for Pain. Max Daily Amount: 4 Tabs. Class: Print Route: Oral  
  
Prescriptions Sent to Pharmacy Refills  
 meloxicam (MOBIC) 15 mg tablet 1 Sig: Take 1 Tab by mouth daily as needed (inflammation).   
 Class: Normal  
 Pharmacy: [address] [place] [address] Ph #: [phone] Route: Oral  
 fluticasone (FLONASE) 50 mcg/actuation nasal spray 12 Si Sprays by Both Nostrils route daily. Class: Normal  
 Pharmacy: [address] [place] [address] Ph #: [phone] Route: Both Nostrils  
 loratadine-pseudoephedrine (CLARITIN-D 12 HOUR) 5-120 mg per tablet 6 Sig: Take 1 Tab by mouth two (2) times a day. Class: Normal  
 Pharmacy: [address] [place] [address] Ph #: [phone] Route: Oral  
 nystatin (MYCOSTATIN) topical cream 12 Sig: Apply  to affected area two (2) times a day. Class: Normal  
 Pharmacy: [address] [place] [address] Ph #: [phone] Route: Topical  
  
Follow-up Instructions Return in about 3 months (around 2017) for rov. Introducing [facility] SERVICES! Pawan Blankenship introduces [employer] patient portal. Now you can access parts of your medical record, email your doctor's office, and request medication refills online. 1. In your internet browser, go to [email or web address] 2. Click on the First Time User? Click Here link in the Sign In box. You will see the New Member Sign Up page. 3. Enter your [employer] Access Code exactly as it appears below. You will not need to use this code after youve completed the sign-up process. If you do not sign up before the expiration date, you must request a new code. · [employer] Access Code: [email or web address] Expires: 2017 11:58 AM 
 
4. Enter the last four digits of your Social Security Number (xxxx) and Date of Birth (mm/dd/yyyy) as indicated and click Submit. You will be taken to the next sign-up page. 5. Create a Astley Clark[employer] ID.  This will be your [employer] login ID and cannot be changed, so think of one that is secure and easy to remember. 6. Create a [email or web address] password. You can change your password at any time. 7. Enter your Password Reset Question and Answer. This can be used at a later time if you forget your password. 8. Enter your e-mail address. You will receive e-mail notification when new information is available in [address] 9. Click Sign Up. You can now view and download portions of your medical record. 10. Click the Download Summary menu link to download a portable copy of your medical information. If you have questions, please visit the Frequently Asked Questions section of the [email or web address] website. Remember, [email or web address] is NOT to be used for urgent needs. For medical emergencies, dial 911. Now available from your iPhone and Android! Please provide this summary of care documentation to your next provider. Your primary care clinician is listed as [id number] [facility]. If you have any questions after today's visit, please call [phone].

## 2017-02-22 NOTE — PROGRESS NOTES
1. Have you been to the ER, urgent care clinic since your last visit? Hospitalized since your last visit? Yes, 2/17/17, [address],  left knee pain    2. Have you seen or consulted any other health care providers outside of the [facility] since your last visit? Include any pap smears or colon screening. No    Patient presents in office today for routine care.   Patient concerns: left knee pain

## 2017-03-07 DIAGNOSIS — M19.90 ARTHRITIS: ICD-10-CM

## 2017-03-07 DIAGNOSIS — E78.00 PURE HYPERCHOLESTEROLEMIA: ICD-10-CM

## 2017-03-07 RX ORDER — PEN NEEDLE, DIABETIC 30 GX3/16"
NEEDLE, DISPOSABLE MISCELLANEOUS
Qty: 300 EACH | Refills: 12 | Status: SHIPPED | OUTPATIENT
Start: 2017-03-07

## 2017-11-29 ENCOUNTER — HOSPITAL ENCOUNTER (OUTPATIENT)
Dept: ULTRASOUND IMAGING | Age: 57
Discharge: HOME | End: 2017-11-29
Attending: INTERNAL MEDICINE
Payer: COMMERCIAL

## 2017-11-29 ENCOUNTER — HOSPITAL ENCOUNTER (OUTPATIENT)
Dept: MAMMOGRAPHY | Age: 57
Discharge: HOME | End: 2017-11-29
Attending: INTERNAL MEDICINE
Payer: COMMERCIAL

## 2017-11-29 DIAGNOSIS — N64.4 BREAST PAIN: ICD-10-CM

## 2017-11-29 PROCEDURE — 77066 DX MAMMO INCL CAD BI: CPT

## 2018-01-20 DIAGNOSIS — Z79.4 CONTROLLED TYPE 2 DIABETES MELLITUS WITHOUT COMPLICATION, WITH LONG-TERM CURRENT USE OF INSULIN (HCC): ICD-10-CM

## 2018-01-20 DIAGNOSIS — E11.9 CONTROLLED TYPE 2 DIABETES MELLITUS WITHOUT COMPLICATION, WITH LONG-TERM CURRENT USE OF INSULIN (HCC): ICD-10-CM

## 2018-01-21 RX ORDER — BLOOD SUGAR DIAGNOSTIC
STRIP MISCELLANEOUS
Qty: 100 STRIP | Refills: 12 | Status: SHIPPED | OUTPATIENT
Start: 2018-01-21